# Patient Record
Sex: FEMALE | Race: WHITE
[De-identification: names, ages, dates, MRNs, and addresses within clinical notes are randomized per-mention and may not be internally consistent; named-entity substitution may affect disease eponyms.]

---

## 2018-01-31 ENCOUNTER — HOSPITAL ENCOUNTER (OUTPATIENT)
Dept: HOSPITAL 89 - OR | Age: 33
Setting detail: OBSERVATION
Discharge: HOME | End: 2018-01-31
Attending: OBSTETRICS & GYNECOLOGY | Admitting: OBSTETRICS & GYNECOLOGY
Payer: COMMERCIAL

## 2018-01-31 VITALS — DIASTOLIC BLOOD PRESSURE: 78 MMHG | SYSTOLIC BLOOD PRESSURE: 111 MMHG

## 2018-01-31 VITALS — DIASTOLIC BLOOD PRESSURE: 83 MMHG | SYSTOLIC BLOOD PRESSURE: 115 MMHG

## 2018-01-31 VITALS — SYSTOLIC BLOOD PRESSURE: 113 MMHG | DIASTOLIC BLOOD PRESSURE: 78 MMHG

## 2018-01-31 VITALS — SYSTOLIC BLOOD PRESSURE: 114 MMHG | DIASTOLIC BLOOD PRESSURE: 81 MMHG

## 2018-01-31 VITALS — SYSTOLIC BLOOD PRESSURE: 108 MMHG | DIASTOLIC BLOOD PRESSURE: 77 MMHG

## 2018-01-31 VITALS
BODY MASS INDEX: 20.88 KG/M2 | HEIGHT: 69 IN | BODY MASS INDEX: 20.88 KG/M2 | WEIGHT: 141 LBS | HEIGHT: 69 IN | WEIGHT: 141 LBS

## 2018-01-31 VITALS — SYSTOLIC BLOOD PRESSURE: 113 MMHG | DIASTOLIC BLOOD PRESSURE: 87 MMHG

## 2018-01-31 VITALS — SYSTOLIC BLOOD PRESSURE: 110 MMHG | DIASTOLIC BLOOD PRESSURE: 80 MMHG

## 2018-01-31 VITALS — DIASTOLIC BLOOD PRESSURE: 64 MMHG | SYSTOLIC BLOOD PRESSURE: 117 MMHG

## 2018-01-31 VITALS — DIASTOLIC BLOOD PRESSURE: 69 MMHG | SYSTOLIC BLOOD PRESSURE: 109 MMHG

## 2018-01-31 VITALS — SYSTOLIC BLOOD PRESSURE: 120 MMHG | DIASTOLIC BLOOD PRESSURE: 57 MMHG

## 2018-01-31 VITALS — SYSTOLIC BLOOD PRESSURE: 107 MMHG | DIASTOLIC BLOOD PRESSURE: 84 MMHG

## 2018-01-31 DIAGNOSIS — N92.1: ICD-10-CM

## 2018-01-31 DIAGNOSIS — N94.6: Primary | ICD-10-CM

## 2018-01-31 LAB — PLATELET COUNT, AUTOMATED: 263 K/UL (ref 150–450)

## 2018-01-31 PROCEDURE — 85025 COMPLETE CBC W/AUTO DIFF WBC: CPT

## 2018-01-31 PROCEDURE — 58552 LAPARO-VAG HYST INCL T/O: CPT

## 2018-01-31 PROCEDURE — 36415 COLL VENOUS BLD VENIPUNCTURE: CPT

## 2018-01-31 PROCEDURE — 88302 TISSUE EXAM BY PATHOLOGIST: CPT

## 2018-01-31 PROCEDURE — 85018 HEMOGLOBIN: CPT

## 2018-01-31 PROCEDURE — 85014 HEMATOCRIT: CPT

## 2018-01-31 PROCEDURE — 84703 CHORIONIC GONADOTROPIN ASSAY: CPT

## 2018-01-31 RX ADMIN — KETOROLAC TROMETHAMINE SCH MG: 30 INJECTION, SOLUTION INTRAMUSCULAR; INTRAVENOUS at 15:31

## 2018-01-31 RX ADMIN — KETOROLAC TROMETHAMINE SCH MG: 30 INJECTION, SOLUTION INTRAMUSCULAR; INTRAVENOUS at 21:40

## 2018-01-31 NOTE — OPERATIVE REPORT 1
EVENT DATE:  2018

SURGEON:  Isrrael Arthur MD

ANESTHESIOLOGIST:  Rodriguez Torres MD

ANESTHESIA:  General endotracheal.

ASSISTANT:  Myrtle Tolbert MD





PREOPERATIVE DIAGNOSES  

1.  Dysmenorrhea.

2.  Menometrorrhagia.  

3.  Previous  section times four.



POSTOPERATIVE DIAGNOSES 

1.  Dysmenorrhea.

2.  Menometrorrhagia.

3.  Previous  section times four.



PROCEDURES PERFORMED 

1.  Laparoscopic-assisted vaginal hysterectomy.

2.  Bilateral salpingectomy.

3.  Diagnostic cystoscopy.

4.  Modified Gilman culdoplasty.



ESTIMATED BLOOD LOSS 

Less than 100 mL.



FLUIDS

Crystalloid 1600 mL IV.



URINE OUTPUT

Not measured.



FINDINGS

Enlarged uterus.  Scarring of the previous  section site.  Otherwise, 
normal-appearing uterus, tubes, and ovaries.  Normal right upper quadrant.  
Retrocecal appendix in the right lower quadrant.  



DESCRIPTION OF PROCEDURE

The patient was brought to the operating room with a working IV and placed in 
the dorsal supine position.  She was placed under general endotracheal 
anesthesia by Dr. Torres and moved to the dorsal lithotomy position.  She was 
then prepped and draped in the usual sterile fashion.  A weighted speculum was 
placed in the vagina, and the cervix was grasped on the anterior lip with a 
single-toothed tenaculum.  It was carefully sounded to a depth of 11 cm.  A 
size 10 DESI uterine manipulator was selected and assembled.  The cervix was 
dilated to accommodate, and it was passed through the cervix into the uterus.  
The bulb was inflated and secured, and all other instruments were then removed.
  The legs were brought back to the supine position, and gloves were changed.  
The umbilicus received a local injection of 0.2% Naropin.  A stab incision was 
made with the 11 blade scalpel in a transverse fashion.  A Veress needle was 
then passed through this incision into the abdomen while elevating and 
stabilizing the anterior abdominal wall.  A pneumoperitoneum was created to an 
intra-abdominal pressure of 20 mmHg.  A 5 mm bladeless trocar was then passed 
through this incision into the abdomen under direct visualization with the 
scope and without incident.  Two additional 5 mm ports were placed in the right 
and left lower quadrants using direct visualization and without incident.  The 
pressure was reduced to 15 mmHg.  The Pires catheter had been placed.  The 
bladder was drained.  The uterus and pelvis were inspected with the above 
findings noted.  The left fallopian tube was grasped on the fimbriated end and 
put on medial stretch.  It was cauterized with the Gyrus device and dissected 
along its mesosalpinx connection to the uterine ligament.  This was grasped and 
cauterized with the Gyrus device and transected, as well as the round ligament 
on this side.  The broad ligament was then entered and  into anterior 
and posterior leaflets.  There was some scarring of the bladder reflection up 
to this left broad ligament.  This was taken down, and dissecting into the 
broad ligament, we were able to dissect this down successfully away from the 
uterus.  The prior  section scar was dissected down using the Gyrus 
device, and the bladder was pushed back.  The posterior leaflet was dissected 
along the uterine vasculature to the uterosacral ligaments.  This exposed the 
uterine vessels which were cauterized, but not transected.  The same procedure 
was followed on the contralateral side, dissecting the tube away from the ovary 
and its mesosalpinx connection, cauterizing and transecting the utero-ovarian 
ligament as well as the round ligament on the right side.  The broad ligament 
was then entered sharply and  into anterior and posterior leaflets.  
The anterior leaflet continued along its anterior reflection to complete the 
bladder reflection.  The posterior leaflet was followed posteriorly, 
skeletonizing the uterine vasculature.  This was then cauterized, but not 
transected.  No complications at this point.  The pneumoperitoneum was 
released.  All instruments were secured to the patient's abdomen and covered 
with a sterile drape.  The legs were brought back to the lithotomy position, 
and we proceeded vaginally.  A weighted speculum was placed in the vagina.  The 
cervix was grasped on the anterior and posterior lips with Pinky clamps.  The 
cervix was circumferentially injected with diluted Pitressin solution, and a 
circumferential incision was made with the Bovie.  The posterior cul-de-sac was 
entered sharply by putting the posterior vagina on stretch and entering the 
posterior cul-de-sac with a sharp, curved Saucedo scissors.  The long-bill 
weighted speculum was inserted here.  This exposed the uterosacral ligaments 
which were bilaterally clamped, cut, suture ligated, and tagged for lateral 
identification.  Anteriorly, the vaginal mucosa was dissected off the cervix 
using sharp dissection and pushed back to reach the laparoscopic dissection.  A 
right angle was inserted here, and the bladder was retracted away from the 
operative area.  This exposed the cardinal ligaments which were bilaterally 
clamped, cut, and suture ligated.  The remaining vascular pedicle was isolated, 
clamped, cut, and the uterus was excised and sent to Pathology.  The remaining 
pedicle was suture ligated and tagged.  At this point, there was a vascular 
bleeder on the left uterine vascular pedicle which received another curved 
Otto clamp and suture ligated for excellent hemostasis.  A modified Gilman 
culdoplasty was performed by securing the uterosacral ligaments to the 
ipsilateral vaginal cuff and obliterating the posterior cul-de-sac with 
external Gilman stitches.  The parietal peritoneum was closed with a running 
pursestring stitch.  The uterosacral ligaments were tied and secured in the 
midline, and the vaginal mucosa was repaired using a 2-0 Vicryl in a running 
locking stitch.  The Pires catheter was then removed.  Diagnostic cystoscopy 
was performed.  Both ureteral orifices were observed to have excellent urine 
jets.  There were no visible complications or injuries to the bladder.  The 
bladder was drained.  The Pires catheter was replaced.  The legs were brought 
back to the supine position.  Gloves were changed, and we proceeded again 
laparoscopically, inspecting the surgical results from the laparoscope.  There 
was excellent hemostasis, no visible complications, and excellent support of 
the vagina at its apex.  Both ovaries appeared viable and healthy.  The pelvis 
was irrigated and suctioned dry.  The procedure was terminated.  All 
instruments were removed.  The pneumoperitoneum was suctioned out.  The skin 
incisions were repaired with a 4-0 Monocryl simple subdermal and covered with 
Dermabond skin adhesive.  She tolerated the procedure well.  Sponge, lap, needle
, and instrument counts were all correct times three.  She was taken to 
recovery in stable condition. 
CHASITY

## 2018-01-31 NOTE — POST OPERATIVE NOTE
Operative Note - OB/GYN


Operative Day


Date:  Jan 31, 2018


Time:  10:02





Physicians


Surgeon:  


Sandhya


Assistant:  


Tomasz


Anesthesia:  


GETA





Diagnosis


Pre-Op Diagnosis:  


Dysmenorrhea


Menometrorrhagia


Post-Op Diagnosis:  


same





Procedure


Findings:  


enlarged uterus


Procedure(s):  


LAVH


BS


MMC


Cysto


Specimen Removed:(Maybe N/A):  


Uterus, tubes


Complications:  


[id number]





Fluids


Fluids:  


1600


Estimated Blood Loss:  


<100 ml





Dictated


Date OP Note Dictated:  Jan 31, 2018


Time OP Note Dictated:  10:03


Copies to:   JOSE TORRES MD, TRAVIS MD Jan 31, 2018 10:03

## 2018-04-30 ENCOUNTER — HOSPITAL ENCOUNTER (OUTPATIENT)
Dept: HOSPITAL 89 - US | Age: 33
End: 2018-04-30
Attending: OBSTETRICS & GYNECOLOGY
Payer: COMMERCIAL

## 2018-04-30 VITALS — BODY MASS INDEX: 25.92 KG/M2

## 2018-04-30 DIAGNOSIS — N60.42: Primary | ICD-10-CM

## 2018-04-30 DIAGNOSIS — N63.21: ICD-10-CM

## 2018-04-30 DIAGNOSIS — R92.8: ICD-10-CM

## 2018-04-30 PROCEDURE — 77066 DX MAMMO INCL CAD BI: CPT

## 2018-04-30 PROCEDURE — 77062 BREAST TOMOSYNTHESIS BI: CPT

## 2018-05-01 NOTE — RADIOLOGY IMAGING REPORT
FACILITY: [facility] 

 

PATIENT NAME: LISSY GALE

: [id number]

MR: [id number]

V: [id number]

EXAM DATE: [id number]

ORDERING PHYSICIAN: JOSE TORRES

TECHNOLOGIST: Arabella Tillman

 

PROCEDURE:BILATERAL DIAGNOSTIC DIGITAL MAMMOGRAM WITH CAD ASSISTED 

INTERPRETATION & 3D TOMOSYNTHESIS

 

COMPARISON:None.

 

INDICATIONS:LEFT BREAST LUMP, PALPABLE LUMP APPROXIMATE 2:30 POSITION 

LATERAL LEFT BREAST

 

FINDINGS:  

Dense heterogeneous fibroglandular tissue is seen throughout the 

breasts. There is no demonstration of malignant appearing mass, 

malignant appearing calcification or other secondary sign of malignancy 

in either breast.

 

DIAGNOSTIC CATEGORY 0--INCOMPLETE: NEED ADDITIONAL IMAGING EVALUATION.  

 

 

RECOMMENDATIONS:

BREAST MRI: BILATERAL BREASTS.    

 

IMPRESSION: 

BIRADS 4: Bilateral breast MR with & without contrast recommended to 

evaluate the ovoid hypoechoic mass in the far lateral 2:30 position of 

the left breast for which no mammographic correlate could be 

demonstrated.

 

 

 

 

 

 

 

 

 

 

Dictated by:  Ama Delvalle M.D. on 2018 at 17:03   

Transcribed by: PAULO on 2018 at 7:32    

Approved by:  Ama Delvalle M.D. on 2018 at 10:36   

[facility]

## 2018-05-01 NOTE — RADIOLOGY IMAGING REPORT
FACILITY: [facility] 

 

PATIENT NAME: LISSY GALE

: [id number]

MR: [id number]

V: [id number]

EXAM DATE: [id number]

ORDERING PHYSICIAN: JOSE TORRES

TECHNOLOGIST: Jese Munguia

 

PROCEDURE:US LEFT BREAST COMPLETE

 

COMPARISON:None.

 

INDICATIONS:LEFT BREAST LUMP

 

FINDINGS:  

In the far lateral 2:30 position of the left breast there is a 1.1 x 

0.4 x1.7cm ovoid hypoechoic structure that appears extremely 

hypervascular. There is no acoustic shadowing. Several mildly dilated 

ducts are identified in the 4 o'clock position 1cm from the nipple 

measuring up to 2mm in diameter. Three fatty replaced lymph nodes are 

identified in the left axilla, the largest measuring 1.4 x 0.5 x 1cm. 

Today's mammogram did not demonstrate the hypoechoic mass in the 2:30 

position of the far lateral left breast. Given patient's family history 

of lymphoma, a bilateral breast MR with & without contrast is 

recommended for further evaluation.

 

DIAGNOSTIC CATEGORY 0--INCOMPLETE: NEED ADDITIONAL IMAGING EVALUATION.  

 

 

RECOMMENDATIONS:

BREAST MRI: BILATERAL BREASTS.    

 

 

IMPRESSION: 

 

BIRADS 0: Incomplete, need additional imaging evaluation.

Bilateral breast MR with & without contrast recommended for further 

evaluation as described above.

 

 

 

 

 

Dictated by:  Ama Delvalle M.D. on 2018 at 17:01   

Transcribed by: PAULO on 2018 at 7:37    

Approved by:  Ama Delvalle M.D. on 2018 at 10:36   

[facility]